# Patient Record
Sex: MALE | Race: BLACK OR AFRICAN AMERICAN | NOT HISPANIC OR LATINO | URBAN - METROPOLITAN AREA
[De-identification: names, ages, dates, MRNs, and addresses within clinical notes are randomized per-mention and may not be internally consistent; named-entity substitution may affect disease eponyms.]

---

## 2019-08-08 ENCOUNTER — APPOINTMENT (RX ONLY)
Dept: URBAN - METROPOLITAN AREA OTHER 10 | Facility: OTHER | Age: 29
Setting detail: DERMATOLOGY
End: 2019-08-08

## 2019-08-08 DIAGNOSIS — B36.0 PITYRIASIS VERSICOLOR: ICD-10-CM

## 2019-08-08 DIAGNOSIS — L21.8 OTHER SEBORRHEIC DERMATITIS: ICD-10-CM

## 2019-08-08 PROCEDURE — ? KOH PREP

## 2019-08-08 PROCEDURE — ? PRESCRIPTION

## 2019-08-08 PROCEDURE — 99202 OFFICE O/P NEW SF 15 MIN: CPT

## 2019-08-08 PROCEDURE — ? TREATMENT REGIMEN

## 2019-08-08 PROCEDURE — 87220 TISSUE EXAM FOR FUNGI: CPT

## 2019-08-08 PROCEDURE — ? COUNSELING

## 2019-08-08 RX ORDER — SODIUM SULFACETAMIDE AND SULFUR 80; 40 MG/ML; MG/ML
LOTION TOPICAL
Qty: 473 | Refills: 3 | Status: ERX | COMMUNITY
Start: 2019-08-08

## 2019-08-08 RX ORDER — FLUCONAZOLE 200 MG/1
TABLET ORAL
Qty: 3 | Refills: 0 | Status: ERX | COMMUNITY
Start: 2019-08-08

## 2019-08-08 RX ADMIN — SODIUM SULFACETAMIDE AND SULFUR 1: 80; 40 LOTION TOPICAL at 00:00

## 2019-08-08 RX ADMIN — FLUCONAZOLE: 200 TABLET ORAL at 00:00

## 2019-08-08 ASSESSMENT — LOCATION DETAILED DESCRIPTION DERM
LOCATION DETAILED: STERNUM
LOCATION DETAILED: LEFT INFERIOR LATERAL MALAR CHEEK
LOCATION DETAILED: RIGHT INFERIOR LATERAL MALAR CHEEK
LOCATION DETAILED: RIGHT SUPERIOR CENTRAL BUCCAL CHEEK

## 2019-08-08 ASSESSMENT — LOCATION ZONE DERM
LOCATION ZONE: TRUNK
LOCATION ZONE: FACE

## 2019-08-08 ASSESSMENT — LOCATION SIMPLE DESCRIPTION DERM
LOCATION SIMPLE: RIGHT CHEEK
LOCATION SIMPLE: LEFT CHEEK
LOCATION SIMPLE: CHEST

## 2019-08-08 ASSESSMENT — SEVERITY ASSESSMENT: SEVERITY: MILD TO MODERATE

## 2019-08-08 NOTE — PROCEDURE: KOH PREP
Koh Intro Text (From The.....): A KOH prep was ordered and evaluated from the
Koh Procedure Text (Tissue Harvesting Technique): A 15-blade scalpel was used to scrape the skin. The skin scrapings were placed on a glass slide, covered with a coverslip and a KOH solution was applied.
Showing: budding yeast and branching hyphae
Detail Level: Zone

## 2019-08-08 NOTE — PROCEDURE: TREATMENT REGIMEN
Otc Regimen: Selsum blue apply to face, neck and chest leave on for 1-2 min wash off in the shower once daily x7 days then Decrease to once weekly for 3 more weeks
Initiate Treatment: Fluconazole 200mg one tab once weekly x3 weeks \\nSulfacleanse wash face chest and back once daily
Detail Level: Simple

## 2019-09-18 ENCOUNTER — APPOINTMENT (RX ONLY)
Dept: URBAN - METROPOLITAN AREA OTHER 10 | Facility: OTHER | Age: 29
Setting detail: DERMATOLOGY
End: 2019-09-18

## 2019-09-18 DIAGNOSIS — B36.0 PITYRIASIS VERSICOLOR: ICD-10-CM | Status: WELL CONTROLLED

## 2019-09-18 PROCEDURE — ? TREATMENT REGIMEN

## 2019-09-18 PROCEDURE — ? COUNSELING

## 2019-09-18 PROCEDURE — 99213 OFFICE O/P EST LOW 20 MIN: CPT

## 2019-09-18 PROCEDURE — ? PRESCRIPTION

## 2019-09-18 RX ORDER — KETOCONAZOLE 20 MG/G
CREAM TOPICAL BID
Qty: 60 | Refills: 1 | Status: ERX | COMMUNITY
Start: 2019-09-18

## 2019-09-18 RX ADMIN — KETOCONAZOLE: 20 CREAM TOPICAL at 17:25

## 2019-09-18 ASSESSMENT — LOCATION DETAILED DESCRIPTION DERM: LOCATION DETAILED: STERNUM

## 2019-09-18 ASSESSMENT — LOCATION ZONE DERM: LOCATION ZONE: TRUNK

## 2019-09-18 ASSESSMENT — LOCATION SIMPLE DESCRIPTION DERM: LOCATION SIMPLE: CHEST

## 2019-09-18 ASSESSMENT — SEVERITY ASSESSMENT: SEVERITY: MILD

## 2019-09-18 NOTE — PROCEDURE: TREATMENT REGIMEN
Initiate Treatment: Ketoconazole Apply twice daily on chest x3 weeks  as needed for flares
Plan: Pt requested ketoconazole on hand for flares due to cream helping rash in the past
Otc Regimen: Selsum blue as directed as needed for flares
Continue Regimen: Sulfacleanse once weekly
Detail Level: Simple
Discontinue Regimen: Fluconazole 200mg one tab once weekly x3 weeks

## 2019-11-05 ENCOUNTER — RX ONLY (OUTPATIENT)
Age: 29
Setting detail: RX ONLY
End: 2019-11-05

## 2019-11-05 RX ORDER — SODIUM SULFACETAMIDE AND SULFUR 80; 40 MG/ML; MG/ML
LOTION TOPICAL
Qty: 473 | Refills: 4 | Status: ERX